# Patient Record
Sex: MALE | Race: WHITE | URBAN - METROPOLITAN AREA
[De-identification: names, ages, dates, MRNs, and addresses within clinical notes are randomized per-mention and may not be internally consistent; named-entity substitution may affect disease eponyms.]

---

## 2017-07-31 ENCOUNTER — TELEPHONE (OUTPATIENT)
Dept: FAMILY MEDICINE CLINIC | Age: 42
End: 2017-07-31

## 2017-09-06 ENCOUNTER — TELEPHONE (OUTPATIENT)
Dept: FAMILY MEDICINE CLINIC | Age: 42
End: 2017-09-06

## 2017-11-09 ENCOUNTER — TELEPHONE (OUTPATIENT)
Dept: FAMILY MEDICINE CLINIC | Age: 42
End: 2017-11-09

## 2020-07-21 ENCOUNTER — TELEPHONE (OUTPATIENT)
Dept: FAMILY MEDICINE CLINIC | Age: 45
End: 2020-07-21

## 2020-07-21 NOTE — TELEPHONE ENCOUNTER
PT spouse informed that PT would be considered a New PT, informed that Dr Esequiel Grace is accepting new PTs, but is booked until end og August. Provided PT spouse # to call for new PT appt Anabel, [phone]

## 2020-07-21 NOTE — TELEPHONE ENCOUNTER
I am not accepting new patients. If her schedule allows consider offering an appointment with Dr. Isabella Banegas.

## 2020-11-13 NOTE — TELEPHONE ENCOUNTER
Called pt LMOM about overdue Stress Test & ask him to call us back to let us know if he is or is not going to have this done
none